# Patient Record
Sex: MALE | Race: WHITE
[De-identification: names, ages, dates, MRNs, and addresses within clinical notes are randomized per-mention and may not be internally consistent; named-entity substitution may affect disease eponyms.]

---

## 2019-04-13 ENCOUNTER — HOSPITAL ENCOUNTER (EMERGENCY)
Dept: HOSPITAL 62 - ER | Age: 74
Discharge: HOME | End: 2019-04-13
Payer: MEDICARE

## 2019-04-13 VITALS — SYSTOLIC BLOOD PRESSURE: 154 MMHG | DIASTOLIC BLOOD PRESSURE: 82 MMHG

## 2019-04-13 DIAGNOSIS — Z91.013: ICD-10-CM

## 2019-04-13 DIAGNOSIS — I25.10: ICD-10-CM

## 2019-04-13 DIAGNOSIS — E11.9: ICD-10-CM

## 2019-04-13 DIAGNOSIS — Z88.5: ICD-10-CM

## 2019-04-13 DIAGNOSIS — Z95.5: ICD-10-CM

## 2019-04-13 DIAGNOSIS — I10: ICD-10-CM

## 2019-04-13 DIAGNOSIS — K59.00: Primary | ICD-10-CM

## 2019-04-13 DIAGNOSIS — N39.0: ICD-10-CM

## 2019-04-13 DIAGNOSIS — Z87.891: ICD-10-CM

## 2019-04-13 DIAGNOSIS — Z79.2: ICD-10-CM

## 2019-04-13 LAB
ADD MANUAL DIFF: NO
ALBUMIN SERPL-MCNC: 3.8 G/DL (ref 3.5–5)
ALP SERPL-CCNC: 61 U/L (ref 38–126)
ALT SERPL-CCNC: 23 U/L (ref 21–72)
ANION GAP SERPL CALC-SCNC: 11 MMOL/L (ref 5–19)
APPEARANCE UR: CLEAR
APTT PPP: (no result) S
AST SERPL-CCNC: 33 U/L (ref 17–59)
BASOPHILS # BLD AUTO: 0 10^3/UL (ref 0–0.2)
BASOPHILS NFR BLD AUTO: 0.2 % (ref 0–2)
BILIRUB DIRECT SERPL-MCNC: 0.4 MG/DL (ref 0–0.4)
BILIRUB SERPL-MCNC: 0.9 MG/DL (ref 0.2–1.3)
BILIRUB UR QL STRIP: NEGATIVE
BUN SERPL-MCNC: 20 MG/DL (ref 7–20)
CALCIUM: 9 MG/DL (ref 8.4–10.2)
CHLORIDE SERPL-SCNC: 102 MMOL/L (ref 98–107)
CO2 SERPL-SCNC: 25 MMOL/L (ref 22–30)
EOSINOPHIL # BLD AUTO: 0.1 10^3/UL (ref 0–0.6)
EOSINOPHIL NFR BLD AUTO: 0.4 % (ref 0–6)
ERYTHROCYTE [DISTWIDTH] IN BLOOD BY AUTOMATED COUNT: 13.8 % (ref 11.5–14)
GLUCOSE SERPL-MCNC: 130 MG/DL (ref 75–110)
GLUCOSE UR STRIP-MCNC: >=500 MG/DL
HCT VFR BLD CALC: 46 % (ref 37.9–51)
HGB BLD-MCNC: 15.7 G/DL (ref 13.5–17)
KETONES UR STRIP-MCNC: NEGATIVE MG/DL
LIPASE SERPL-CCNC: 3742.5 U/L (ref 23–300)
LYMPHOCYTES # BLD AUTO: 0.9 10^3/UL (ref 0.5–4.7)
LYMPHOCYTES NFR BLD AUTO: 5.8 % (ref 13–45)
MCH RBC QN AUTO: 29.6 PG (ref 27–33.4)
MCHC RBC AUTO-ENTMCNC: 34.1 G/DL (ref 32–36)
MCV RBC AUTO: 87 FL (ref 80–97)
MONOCYTES # BLD AUTO: 1.4 10^3/UL (ref 0.1–1.4)
MONOCYTES NFR BLD AUTO: 9.3 % (ref 3–13)
NEUTROPHILS # BLD AUTO: 12.5 10^3/UL (ref 1.7–8.2)
NEUTS SEG NFR BLD AUTO: 84.3 % (ref 42–78)
NITRITE UR QL STRIP: NEGATIVE
PH UR STRIP: 6 [PH] (ref 5–9)
PLATELET # BLD: 192 10^3/UL (ref 150–450)
POTASSIUM SERPL-SCNC: 4 MMOL/L (ref 3.6–5)
PROT SERPL-MCNC: 7 G/DL (ref 6.3–8.2)
PROT UR STRIP-MCNC: NEGATIVE MG/DL
RBC # BLD AUTO: 5.3 10^6/UL (ref 4.35–5.55)
SODIUM SERPL-SCNC: 137.8 MMOL/L (ref 137–145)
SP GR UR STRIP: 1.01
TOTAL CELLS COUNTED % (AUTO): 100 %
UROBILINOGEN UR-MCNC: NEGATIVE MG/DL (ref ?–2)
WBC # BLD AUTO: 14.8 10^3/UL (ref 4–10.5)

## 2019-04-13 PROCEDURE — S0119 ONDANSETRON 4 MG: HCPCS

## 2019-04-13 PROCEDURE — 74019 RADEX ABDOMEN 2 VIEWS: CPT

## 2019-04-13 PROCEDURE — 83690 ASSAY OF LIPASE: CPT

## 2019-04-13 PROCEDURE — 76770 US EXAM ABDO BACK WALL COMP: CPT

## 2019-04-13 PROCEDURE — 99284 EMERGENCY DEPT VISIT MOD MDM: CPT

## 2019-04-13 PROCEDURE — 36415 COLL VENOUS BLD VENIPUNCTURE: CPT

## 2019-04-13 PROCEDURE — 80053 COMPREHEN METABOLIC PANEL: CPT

## 2019-04-13 PROCEDURE — 87086 URINE CULTURE/COLONY COUNT: CPT

## 2019-04-13 PROCEDURE — 81001 URINALYSIS AUTO W/SCOPE: CPT

## 2019-04-13 PROCEDURE — 85025 COMPLETE CBC W/AUTO DIFF WBC: CPT

## 2019-04-13 NOTE — RADIOLOGY REPORT (SQ)
EXAM DESCRIPTION:  U/S RETROPERITON (RENAL/AORTA)



COMPLETED DATE/TIME:  4/13/2019 3:39 pm



REASON FOR STUDY:  eval for renal stone



COMPARISON:  None.



TECHNIQUE:  Dynamic and static grayscale images acquired of the kidneys and bladder and recorded on P
ACS. Additional selected color Doppler and spectral images recorded.



LIMITATIONS:  None.



FINDINGS:  RIGHT KIDNEY: Normal size. Normal echogenicity. No solid or suspicious masses. No hydronep
hrosis. No calcifications.

LEFT KIDNEY:  Normal size. Normal echogenicity. No solid or suspicious masses. No hydronephrosis. No 
calcifications.

BLADDER: No masses.

OTHER FINDINGS: No other significant finding.



IMPRESSION:  NORMAL RENAL AND BLADDER ULTRASOUND.



TECHNICAL DOCUMENTATION:  JOB ID:  [id number]

 2011 [employer]- All Rights Reserved



Reading location - IP/workstation name: [email or web address]

## 2019-04-13 NOTE — RADIOLOGY REPORT (SQ)
EXAM DESCRIPTION:  ABDOMEN 2 VIEWS



COMPLETED DATE/TIME:  4/13/2019 4:25 pm



REASON FOR STUDY:  abdominal pain, constipation



COMPARISON:  None.



NUMBER OF VIEWS:  Two views.



TECHNIQUE:  Supine and erect/decubitus radiographic images of the abdomen acquired.



LIMITATIONS:  None.



FINDINGS:  FREE AIR: None. No abnormal gas collections.

LUNG BASES: Clear.

BOWEL GAS PATTERN: Nonobstructive pattern. No dilated loops or air fluid levels.  Marked amount of fe
leticia material in the colon.  Fluid level on the right colon.

CALCIFICATIONS: No suspicious calcifications.

SOFT TISSUES: No gross mass or suggestion of organomegaly.

HARDWARE: None in the abdomen.

BONES: No acute fracture. No worrisome bone lesions.

OTHER: No other significant finding.



IMPRESSION:  Marked constipation.  Fluid level in the right colon.



TECHNICAL DOCUMENTATION:  JOB ID:  [id number]

 2011 [employer]- All Rights Reserved



Reading location - IP/workstation name: [email or web address]

## 2019-04-13 NOTE — ER DOCUMENT REPORT
ED Medical Screen (RME)





- General


Chief Complaint: Flank Pain


Stated Complaint: ABDOMINAL PAIN


Time Seen by Provider: 04/13/19 14:33


Mode of Arrival: Ambulatory


Information source: Patient


Notes: 





Patient is a 73-year-old male who recently had a kidney stone diagnosed with a 

urinary tract infection.  Patient was started on Levaquin.  Patient states that 

his pain has persisted.  He denies any nausea, vomiting, diarrhea or fevers.  He

reports he now has bilateral flank pain and pain across his low abdomen.  

Patient's vital signs are within normal limits, patient appears nontoxic and is 

in no acute distress.





PHYSICAL EXAMINATION:





GENERAL: Well-appearing, well-nourished and in no acute distress.





HEAD: Atraumatic, normocephalic.





EYES: Pupils equal round extraocular movements intact,  conjunctiva are normal.





ENT: Nares patent





NECK: Normal range of motion





LUNGS: No respiratory distress





Musculoskeletal: Normal range of motion





NEUROLOGICAL:  Normal speech, normal gait. 





PSYCH: Normal mood, normal affect.





SKIN: Warm, Dry, normal turgor, no rashes or lesions noted.





I have greeted and performed a rapid initial assessment of this patient.  A 

comprehensive ED assessment and evaluation of the patient, analysis of test 

results and completion of the medical decision making process will be conducted 

by additional ED providers.  Dictation of this chart was performed using voice r

ecogniRypos software; therefore, there may be some unintended grammatical errors.


TRAVEL OUTSIDE OF THE [place] IN LAST 30 DAYS: No





- Related Data


Allergies/Adverse Reactions: 


                                        





Shellfish * [Shellfish] Allergy (Intermediate, Verified 04/13/19 14:25)


   WHEEZING


oxycodone HCl [From Percocet] Allergy (Verified 04/13/19 14:25)


   Difficulty breathing











Past Medical History





- Past Medical History


Cardiac Medical History: Reports: Hx Coronary Artery Disease - STENT, Hx Heart A

ttack, Hx Hypercholesterolemia, Hx Hypertension


Pulmonary Medical History: 


   Denies: Hx Asthma, Hx Bronchitis, Hx COPD, Hx Pneumonia


Neurological Medical History: Denies: Hx Cerebrovascular Accident, Hx Seizures


Endocrine Medical History: Reports: Hx Diabetes Mellitus Type 2


Renal/ Medical History: Denies: Hx Peritoneal Dialysis


Musculoskeltal Medical History: Reports Hx Arthritis - LE and UE


Past Surgical History: Reports: Hx Cardiac Catheterization - 11 YEARS AGO.  

Denies: Hx Adenoidectomy





- Immunizations


Hx Diphtheria, Pertussis, Tetanus Vaccination: Yes





Physical Exam





- Vital signs


Vitals: 





                                        











Temp Pulse Resp BP Pulse Ox


 


 98.4 F   80   18   149/77 H  96 


 


 04/13/19 14:30  04/13/19 14:30  04/13/19 14:30  04/13/19 14:30  04/13/19 14:30














Course





- Vital Signs


Vital signs: 





                                        











Temp Pulse Resp BP Pulse Ox


 


 98.4 F   80   18   149/77 H  96 


 


 04/13/19 14:30  04/13/19 14:30  04/13/19 14:30  04/13/19 14:30  04/13/19 14:30

## 2019-04-13 NOTE — ER DOCUMENT REPORT
ED General





- General


Chief Complaint: Flank Pain


Stated Complaint: ABDOMINAL PAIN


Time Seen by Provider: 04/13/19 14:33


Primary Care Provider: 


DARY MENDOZA MD [Primary Care Provider] - Follow up as needed


Mode of Arrival: Ambulatory


TRAVEL OUTSIDE OF THE [place] IN LAST 30 DAYS: No





- HPI


Notes: 





Patient presents to the emergency department for evaluation.  He has a multitude

of issues.  He states back on February 28 he started having "kidney pain."  He 

saw his primary doctor and urologist.  He was diagnosed with a potential kidney 

stone as well as UTI.  He states he has frequent UTIs and/or prostatitis.  He 

has a standing prescription for Levaquin which he takes.  He denies any fevers 

or chills.  No nausea or vomiting.  He states his pain seemed to get better, but

over the last several days he feels as if "both of his kidneys" hurt.  He states

today he started noticing pain in the left lower quadrant.  He denies any fever 

or chills.  No nausea or vomiting.  Eating and drinking normally.  He does 

relate that he has not had a good bowel movement in the last 5 days.  He states 

he normally moves his bowels daily.





- Related Data


Allergies/Adverse Reactions: 


                                        





Shellfish * [Shellfish] Allergy (Intermediate, Verified 04/13/19 14:25)


   WHEEZING


oxycodone HCl [From Percocet] Allergy (Verified 04/13/19 14:25)


   Difficulty breathing











Past Medical History





- General


Information source: Patient





- Social History


Smoking Status: Former Smoker


Drug Abuse: None


Family History: Reviewed & Not Pertinent


Patient has suicidal ideation: No


Patient has homicidal ideation: No





- Past Medical History


Cardiac Medical History: Reports: Hx Coronary Artery Disease - STENT, Hx Heart 

Attack, Hx Hypercholesterolemia, Hx Hypertension


Pulmonary Medical History: 


   Denies: Hx Asthma, Hx Bronchitis, Hx COPD, Hx Pneumonia


Neurological Medical History: Denies: Hx Cerebrovascular Accident, Hx Seizures


Endocrine Medical History: Reports: Hx Diabetes Mellitus Type 2


Renal/ Medical History: Denies: Hx Peritoneal Dialysis


Musculoskeletal Medical History: Reports Hx Arthritis - LE and UE


Past Surgical History: Reports: Hx Cardiac Catheterization - 11 YEARS AGO.  

Denies: Hx Adenoidectomy





- Immunizations


Hx Diphtheria, Pertussis, Tetanus Vaccination: Yes


Hx Pneumococcal Vaccination: 01/01/11





Review of Systems





- Review of Systems


Constitutional: No symptoms reported


EENT: No symptoms reported


Cardiovascular: No symptoms reported


Respiratory: No symptoms reported


Gastrointestinal: See HPI


Genitourinary: See HPI


Male Genitourinary: No symptoms reported


Musculoskeletal: No symptoms reported


Skin: No symptoms reported


Neurological/Psychological: No symptoms reported





Physical Exam





- Vital signs


Vitals: 





                                        











Temp Pulse Resp BP Pulse Ox


 


 98.4 F   80   18   149/77 H  96 


 


 04/13/19 14:30  04/13/19 14:30  04/13/19 14:30  04/13/19 14:30  04/13/19 14:30














- Notes


Notes: 





Vital signs reviewed, please refer to chart.  Patient is normocephalic, 

atraumatic.  Pupils equal round, reactive to light.  Neck is supple without 

meningismus.  Heart is regular rate and rhythm.  Lungs are clear to auscultation

bilaterally.  Abdomen is soft, nontender, normoactive bowel sounds throughout.  

Extremities without cyanosis, clubbing, edema.  Peripheral pulses are equal.  Sk

in is warm and dry.  Patient is awake, alert, neurological exam is nonfocal.





Course





- Re-evaluation


Re-evalutation: 





04/13/19 17:38


Patient presents to the emergency department for evaluation.  He had initial 

workup as ordered through triage.  Ultrasound of the abdomen did not reveal any 

hydronephrosis.  His urine does reveal some continued white blood cells, but he 

already has a few more days of Levaquin left.  I believe this left lower 

quadrant pain is secondary to constipation.  Abdominal films failed to reveal 

any signs of obstruction.  I did go ahead and order magnesium citrate.  We will 

also tell the patient to take MiraLAX, twice daily, until he has improvement in 

his stool output.  He is to continue his antibiotics and follow-up with urology 

next week.  Return to the emergency department with worsening or new concerning 

symptoms.





- Vital Signs


Vital signs: 





                                        











Temp Pulse Resp BP Pulse Ox


 


 98.4 F   80   18   149/77 H  96 


 


 04/13/19 14:30  04/13/19 14:30  04/13/19 14:30  04/13/19 14:30  04/13/19 14:30














- Laboratory


Result Diagrams: 


                                 04/13/19 15:45





                                 04/13/19 15:45


Laboratory results interpreted by me: 





                                        











  04/13/19 04/13/19 04/13/19





  14:55 15:45 15:45


 


WBC   14.8 H 


 


Seg Neutrophils %   84.3 H 


 


Lymphocytes %   5.8 L 


 


Absolute Neutrophils   12.5 H 


 


Glucose    130 H


 


Lipase    3742.5 H


 


Urine Glucose (UA)  >=500 H  


 


Ur Leukocyte Esterase  SMALL H  














Discharge





- Discharge


Clinical Impression: 


 UTI (urinary tract infection), Constipation





Condition: Stable


Disposition: HOME, SELF-CARE


Instructions:  Urinary Tract Infection ([facility]), Constipation ([facility])


Additional Instructions: 


All of the magnesium citrate at home, at one time, until gone.  Start MiraLAX, 1

capful twice daily, until bowel movements are soft.  Continue to take your 

Levaquin as directed.  Follow-up with your urologist next week.  Return to the 

emergency department with worsening or new concerning symptoms of any sort.


Forms:  Elevated Blood Pressure


Referrals: 


DARY MENDOZA MD [Primary Care Provider] - Follow up as needed

## 2020-10-27 ENCOUNTER — HOSPITAL ENCOUNTER (EMERGENCY)
Dept: HOSPITAL 62 - ER | Age: 75
Discharge: ANOTHER HOSPITAL | End: 2020-10-27
Payer: MEDICARE

## 2020-10-27 VITALS — SYSTOLIC BLOOD PRESSURE: 126 MMHG | DIASTOLIC BLOOD PRESSURE: 83 MMHG

## 2020-10-27 DIAGNOSIS — Z87.442: ICD-10-CM

## 2020-10-27 DIAGNOSIS — Z20.828: ICD-10-CM

## 2020-10-27 DIAGNOSIS — I25.2: ICD-10-CM

## 2020-10-27 DIAGNOSIS — I25.10: ICD-10-CM

## 2020-10-27 DIAGNOSIS — I10: ICD-10-CM

## 2020-10-27 DIAGNOSIS — E78.00: ICD-10-CM

## 2020-10-27 DIAGNOSIS — R05: ICD-10-CM

## 2020-10-27 DIAGNOSIS — R50.9: ICD-10-CM

## 2020-10-27 DIAGNOSIS — Z87.440: ICD-10-CM

## 2020-10-27 DIAGNOSIS — I21.4: Primary | ICD-10-CM

## 2020-10-27 DIAGNOSIS — E11.9: ICD-10-CM

## 2020-10-27 DIAGNOSIS — R11.0: ICD-10-CM

## 2020-10-27 DIAGNOSIS — R51.9: ICD-10-CM

## 2020-10-27 LAB
A TYPE INFLUENZA AG: NEGATIVE
ADD MANUAL DIFF: NO
ALBUMIN SERPL-MCNC: 3.2 G/DL (ref 3.5–5)
ALP SERPL-CCNC: 82 U/L (ref 38–126)
ANION GAP SERPL CALC-SCNC: 12 MMOL/L (ref 5–19)
APPEARANCE UR: (no result)
APTT BLD: 36.5 SEC (ref 23.5–35.8)
APTT PPP: (no result) S
AST SERPL-CCNC: 72 U/L (ref 17–59)
B INFLUENZA AG: NEGATIVE
BASOPHILS # BLD AUTO: 0 10^3/UL (ref 0–0.2)
BASOPHILS NFR BLD AUTO: 0.3 % (ref 0–2)
BILIRUB DIRECT SERPL-MCNC: 0.2 MG/DL (ref 0–0.4)
BILIRUB SERPL-MCNC: 0.9 MG/DL (ref 0.2–1.3)
BILIRUB UR QL STRIP: NEGATIVE
BUN SERPL-MCNC: 28 MG/DL (ref 7–20)
CALCIUM: 8.5 MG/DL (ref 8.4–10.2)
CHLORIDE SERPL-SCNC: 103 MMOL/L (ref 98–107)
CO2 SERPL-SCNC: 18 MMOL/L (ref 22–30)
EOSINOPHIL # BLD AUTO: 0.1 10^3/UL (ref 0–0.6)
EOSINOPHIL NFR BLD AUTO: 0.7 % (ref 0–6)
ERYTHROCYTE [DISTWIDTH] IN BLOOD BY AUTOMATED COUNT: 13.4 % (ref 11.5–14)
GLUCOSE SERPL-MCNC: 167 MG/DL (ref 75–110)
GLUCOSE UR STRIP-MCNC: NEGATIVE MG/DL
HCT VFR BLD CALC: 37.4 % (ref 37.9–51)
HGB BLD-MCNC: 13 G/DL (ref 13.5–17)
INR PPP: 1.23
KETONES UR STRIP-MCNC: 20 MG/DL
LYMPHOCYTES # BLD AUTO: 0.7 10^3/UL (ref 0.5–4.7)
LYMPHOCYTES NFR BLD AUTO: 6.4 % (ref 13–45)
MCH RBC QN AUTO: 29.4 PG (ref 27–33.4)
MCHC RBC AUTO-ENTMCNC: 34.7 G/DL (ref 32–36)
MCV RBC AUTO: 85 FL (ref 80–97)
MONOCYTES # BLD AUTO: 1.5 10^3/UL (ref 0.1–1.4)
MONOCYTES NFR BLD AUTO: 14.3 % (ref 3–13)
NEUTROPHILS # BLD AUTO: 8.1 10^3/UL (ref 1.7–8.2)
NEUTS SEG NFR BLD AUTO: 78.3 % (ref 42–78)
NITRITE UR QL STRIP: NEGATIVE
PH UR STRIP: 5 [PH] (ref 5–9)
PLATELET # BLD: 161 10^3/UL (ref 150–450)
POTASSIUM SERPL-SCNC: 4.8 MMOL/L (ref 3.6–5)
PROT SERPL-MCNC: 6.2 G/DL (ref 6.3–8.2)
PROT UR STRIP-MCNC: 30 MG/DL
PROTHROMBIN TIME: 15.7 SEC (ref 11.4–15.4)
RBC # BLD AUTO: 4.42 10^6/UL (ref 4.35–5.55)
SP GR UR STRIP: 1.03
TOTAL CELLS COUNTED % (AUTO): 100 %
UROBILINOGEN UR-MCNC: NEGATIVE MG/DL (ref ?–2)
WBC # BLD AUTO: 10.3 10^3/UL (ref 4–10.5)

## 2020-10-27 PROCEDURE — 99285 EMERGENCY DEPT VISIT HI MDM: CPT

## 2020-10-27 PROCEDURE — 85025 COMPLETE CBC W/AUTO DIFF WBC: CPT

## 2020-10-27 PROCEDURE — 93005 ELECTROCARDIOGRAM TRACING: CPT

## 2020-10-27 PROCEDURE — 96375 TX/PRO/DX INJ NEW DRUG ADDON: CPT

## 2020-10-27 PROCEDURE — 71045 X-RAY EXAM CHEST 1 VIEW: CPT

## 2020-10-27 PROCEDURE — 80053 COMPREHEN METABOLIC PANEL: CPT

## 2020-10-27 PROCEDURE — 93010 ELECTROCARDIOGRAM REPORT: CPT

## 2020-10-27 PROCEDURE — 96366 THER/PROPH/DIAG IV INF ADDON: CPT

## 2020-10-27 PROCEDURE — 85730 THROMBOPLASTIN TIME PARTIAL: CPT

## 2020-10-27 PROCEDURE — C9803 HOPD COVID-19 SPEC COLLECT: HCPCS

## 2020-10-27 PROCEDURE — 83880 ASSAY OF NATRIURETIC PEPTIDE: CPT

## 2020-10-27 PROCEDURE — 36415 COLL VENOUS BLD VENIPUNCTURE: CPT

## 2020-10-27 PROCEDURE — 87804 INFLUENZA ASSAY W/OPTIC: CPT

## 2020-10-27 PROCEDURE — 84484 ASSAY OF TROPONIN QUANT: CPT

## 2020-10-27 PROCEDURE — 96365 THER/PROPH/DIAG IV INF INIT: CPT

## 2020-10-27 PROCEDURE — 81001 URINALYSIS AUTO W/SCOPE: CPT

## 2020-10-27 PROCEDURE — 85610 PROTHROMBIN TIME: CPT

## 2020-10-27 PROCEDURE — 87635 SARS-COV-2 COVID-19 AMP PRB: CPT

## 2020-10-27 NOTE — RADIOLOGY REPORT (SQ)
EXAM DESCRIPTION:  CHEST SINGLE VIEW



IMAGES COMPLETED DATE/TIME:  10/27/2020 10:46 am



REASON FOR STUDY:  cough



COMPARISON:  10/15/2013



EXAM PARAMETERS:  NUMBER OF VIEWS: One view.

TECHNIQUE: Single frontal radiographic view of the chest acquired.

RADIATION DOSE: NA

LIMITATIONS: None.



FINDINGS:  LUNGS AND PLEURA: No opacities, masses or pneumothorax. No pleural effusion.

MEDIASTINUM AND HILAR STRUCTURES: No masses.  Contour normal.

HEART AND VASCULAR STRUCTURES: Heart normal in size.  Normal vasculature.

BONES: No acute findings.

HARDWARE: None in the chest.

OTHER: No other significant finding.



IMPRESSION:  NO ACUTE RADIOGRAPHIC FINDING IN THE CHEST.



TECHNICAL DOCUMENTATION:  JOB ID:  [id number]

 2011 [employer]- All Rights Reserved



Reading location - IP/workstation name: [email or web address]

## 2020-10-27 NOTE — ER DOCUMENT REPORT
ED Flu Like





- General


Chief Complaint: Flu Symptoms


Stated Complaint: HEADACHE/NAUSEA/FEVER


Time Seen by Provider: 10/27/20 09:30


Primary Care Provider: 


DARY MENDOZA MD [Primary Care Provider] - Follow up as needed


Notes: 





CHIEF COMPLAINT: Cough for 3 to 4 days





HPI: 75-year-old female presenting with cough that is mildly productive for 3 to

4 days.  No chest pain.  Mild shortness of breath.  Patient states that he does 

have history of UTIs and was placed on a 3-week course of an antibiotic he takes

twice daily but does not remember the name of 2 weeks ago.  Patient had been on 

Levaquin prior to that.  Has not had a fever.  Denies abdominal pain nausea 

vomiting.





ROS: See HPI - all other systems were reviewed and are otherwise negative


Constitutional: no fever 


Eyes: no drainage, no blurred vision


ENT: no runny nose, no sore throat


Cardiovascular:  no chest pain 


Resp: + SOB, + cough


GI: no vomiting, no diarrhea, no abdominal pain


: no dysuria


Integumentary: no rash 


Allergy: no hives 


Musculoskeletal: no extremity pain or swelling 


Neurological: no numbness/tingling, no weakness





MEDICATIONS: I agree with the patient medications as charted by the RN.





ALLERGIES: I agree with the allergies as charted by the RN.





PAST MEDICAL HISTORY/PAST SURGICAL HISTORY: Reviewed and agree as charted by RN.





SOCIAL HISTORY: Reviewed and agree as charted by RN.





FAMILY HISTORY: No significant familial comorbid conditions directly related to 

patient complaint





EXAM:


Reviewed vital signs as charted by RN.


CONSTITUTIONAL: Alert and oriented and responds appropriately to questions. 

Well-appearing; well-nourished


HEAD: Normocephalic; atraumatic


EYES: PERRL; Conjunctivae clear, sclerae non-icteric


ENT: normal nose; no rhinorrhea; moist mucous membranes; pharynx without lesions

noted, no uvula edema or deviation, no tonsillar hypertrophy, phonation normal


NECK: Supple without meningismus; non-tender; no cervical lymphadenopathy, no 

masses


CARD: RRR; no murmurs, no clicks, no rubs, no gallops; symmetric distal pulses


RESP: Normal chest excursion without splinting or tachypnea; breath sounds clear

and equal bilaterally; no wheezes, no rhonchi, no rales, pulse oximetry 97% on 

room air not hypoxic


ABD/GI: Normal bowel sounds; non-distended; soft, non-tender, no rebound, no 

guarding; no palpable organomegaly or masses.


BACK:  The back appears normal and is non-tender to palpation, there is no CVA 

tenderness


EXT: Normal ROM in all joints; non-tender to palpation; no cyanosis, no 

effusions, no edema   


SKIN: Normal color for age and race; warm; dry; good turgor; no acute lesions 

noted


NEURO: Moves all extremities equally; Motor and sensory function intact 


PSYCH: The patient's mood and manner are appropriate. Grooming and personal 

hygiene are appropriate.





MDM: 75-year-old male does have cardiac history presenting with cough shortness 

of breath over the last 3 days.  Believes he may have been exposed to someone 

who was ill.  Will obtain flu and Covid testing and chest x-ray.  He is on an u

nknown antibiotic that he takes twice daily he states he was told by his 

urologist it was good against pneumonia as well.  Will obtain baseline screening

labs including 1 set of cardiac enzymes


TRAVEL OUTSIDE OF THE [place] IN LAST 30 DAYS: No





- Related Data


Allergies/Adverse Reactions: 


                                        





Shellfish * [Shellfish] Allergy (Intermediate, Verified 10/27/20 08:42)


   WHEEZING


oxycodone HCl [From Percocet] Allergy (Verified 10/27/20 08:42)


   Difficulty breathing











Past Medical History





- Social History


Smoking Status: Never Smoker


Family History: Reviewed & Not Pertinent, CAD





- Past Medical History


Cardiac Medical History: Reports: Hx Coronary Artery Disease - STENT, Hx Heart 

Attack, Hx Hypercholesterolemia, Hx Hypertension


Pulmonary Medical History: 


   Denies: Hx Asthma, Hx Bronchitis, Hx COPD, Hx Pneumonia


Neurological Medical History: Denies: Hx Cerebrovascular Accident, Hx Seizures


Endocrine Medical History: Reports: Hx Diabetes Mellitus Type 2


Renal/ Medical History: Reports: Hx Kidney Stones.  Denies: Hx Peritoneal 

Dialysis


Musculoskeletal Medical History: Reports Hx Arthritis


Past Surgical History: Reports: Hx Cardiac Catheterization - 11 YEARS AGO.  

Denies: Hx Adenoidectomy





- Immunizations


Hx Diphtheria, Pertussis, Tetanus Vaccination: Yes


Hx Pneumococcal Vaccination: 01/01/11





Physical Exam





- Vital signs


Vitals: 


                                        











Temp Pulse Resp BP Pulse Ox


 


 97.8 F   96   16   89/60 L  96 


 


 10/27/20 08:42  10/27/20 08:42  10/27/20 08:42  10/27/20 08:42  10/27/20 08:42














Course





- Re-evaluation


Re-evalutation: 





10/27/20 11:12


EKG normal sinus rhythm with a ventricular rate of 90  , QTc 407.  

No other ectopy normal EKG.  Interpreted by emergency department physician.  

Notified by nursing that patient's troponin 1.68.


10/27/20 11:29


I spoke with Dr. Michael Owusu, Cardiology.  Case was discussed.  He is concerned 

about NSTEMI recommends heparin drip.  Patient has no contraindications at this 

time.  Dr. Owusu is not involved with the catheterization group here, will 

speak with cardiology at [facility] where patient's cardiologist Dr. Edmondson is.  discussed with attending Dr. Harman.  


10/27/20 11:34


spoke with [facility] Transfer line, awaiting call back from Dr. Savage


10/27/20 12:19


I spoke with the transfer center again still awaiting callback from Dr. Savage 

he is apparently seeing the patient in their ER and will call me when he is done


10/27/20 12:53


Spoke with Dr. Luis Savage hospitalist at [facility].  Case was discussed.

 We did review the patient's chest x-ray and labs.  Aware patient is on a 

heparin drip.  Accepts patient for transfer, did request Covid test.  I did 

discuss transfer with the patient who is in agreement











- Vital Signs


Vital signs: 


                                        











Temp Pulse Resp BP Pulse Ox


 


 97.8 F   96   28 H  102/56 L  97 


 


 10/27/20 09:22  10/27/20 08:42  10/27/20 12:04  10/27/20 08:45  10/27/20 12:04














- Laboratory


Result Diagrams: 


                                 10/27/20 09:54





                                 10/27/20 09:54


Laboratory results interpreted by me: 


                                        











  10/27/20 10/27/20 10/27/20





  09:54 09:54 09:54


 


Hgb  13.0 L  


 


Hct  37.4 L  


 


Lymph % (Auto)  6.4 L  


 


Mono % (Auto)  14.3 H  


 


Absolute Monos (auto)  1.5 H  


 


Seg Neutrophils %  78.3 H  


 


PT   


 


APTT   


 


Sodium   132.8 L 


 


Carbon Dioxide   18 L 


 


BUN   28 H 


 


Creatinine   1.30 H 


 


Est GFR (MDRD) Non-Af   54 L 


 


Glucose   167 H 


 


AST   72 H 


 


NT-Pro-B Natriuret Pep   


 


Total Protein   6.2 L 


 


Albumin   3.2 L 


 


Urine Protein    30 H


 


Urine Ketones    20 H


 


Urine Ascorbic Acid    40 H














  10/27/20 10/27/20





  09:54 09:54


 


Hgb  


 


Hct  


 


Lymph % (Auto)  


 


Mono % (Auto)  


 


Absolute Monos (auto)  


 


Seg Neutrophils %  


 


PT   15.7 H


 


APTT   36.5 H


 


Sodium  


 


Carbon Dioxide  


 


BUN  


 


Creatinine  


 


Est GFR (MDRD) Non-Af  


 


Glucose  


 


AST  


 


NT-Pro-B Natriuret Pep  3800 H 


 


Total Protein  


 


Albumin  


 


Urine Protein  


 


Urine Ketones  


 


Urine Ascorbic Acid  














Discharge





- Discharge


Clinical Impression: 


 NSTEMI (non-ST elevated myocardial infarction)





Condition: Fair


Disposition: [facility]


Referrals: 


DARY MENDOZA MD [Primary Care Provider] - Follow up as needed